# Patient Record
Sex: FEMALE | Race: BLACK OR AFRICAN AMERICAN | NOT HISPANIC OR LATINO | Employment: OTHER | URBAN - METROPOLITAN AREA
[De-identification: names, ages, dates, MRNs, and addresses within clinical notes are randomized per-mention and may not be internally consistent; named-entity substitution may affect disease eponyms.]

---

## 2019-05-16 PROBLEM — L93.0 DISCOID LUPUS: Status: ACTIVE | Noted: 2019-05-16

## 2019-05-16 PROBLEM — S46.012A STRAIN OF LEFT ROTATOR CUFF CAPSULE: Status: ACTIVE | Noted: 2019-05-16

## 2019-11-05 PROBLEM — H20.9 IRITIS OF LEFT EYE: Status: ACTIVE | Noted: 2019-11-05

## 2019-11-05 PROBLEM — H18.519 FUCHS' CORNEAL DYSTROPHY: Status: ACTIVE | Noted: 2019-11-05

## 2020-07-22 PROBLEM — H20.13: Status: ACTIVE | Noted: 2020-07-22

## 2020-08-19 PROBLEM — H26.492 LEFT POSTERIOR CAPSULAR OPACIFICATION: Status: ACTIVE | Noted: 2020-08-19

## 2020-09-16 PROBLEM — H26.491 PCO (POSTERIOR CAPSULAR OPACIFICATION), RIGHT: Status: ACTIVE | Noted: 2020-09-16

## 2020-10-30 DIAGNOSIS — U07.1 COVID-19 VIRUS DETECTED: ICD-10-CM

## 2020-10-31 ENCOUNTER — NURSE TRIAGE (OUTPATIENT)
Dept: ADMINISTRATIVE | Facility: CLINIC | Age: 60
End: 2020-10-31

## 2020-10-31 ENCOUNTER — PATIENT MESSAGE (OUTPATIENT)
Dept: ADMINISTRATIVE | Facility: OTHER | Age: 60
End: 2020-10-31

## 2020-10-31 NOTE — TELEPHONE ENCOUNTER
Pt already talked to a nurse per .....Duplicate    Reason for Disposition   Caller has already spoken with another triager and has no further questions.    Protocols used: NO CONTACT OR DUPLICATE CONTACT CALL-A-AH

## 2020-11-03 ENCOUNTER — NURSE TRIAGE (OUTPATIENT)
Dept: ADMINISTRATIVE | Facility: CLINIC | Age: 60
End: 2020-11-03

## 2020-11-03 NOTE — TELEPHONE ENCOUNTER
Spoke with patient on behalf of COVID symptom tracker. Patient tested positive for COVID 19 on 10/29/20. States she has been having fever for 6 days. This morning her temp was 101.4. C/O cough, chills, muscle and joint pain, HA, weakness. States she gets dizzy when she first gets up. Disposition per protocol is to discuss with PCP.     Reason for Disposition   [1] Fever > 101 F (38.3 C) AND [2] age > 60    Additional Information   Negative: Severe difficulty breathing (e.g., struggling for each breath, speaks in single words)   Negative: Difficult to awaken or acting confused (e.g., disoriented, slurred speech)   Negative: Bluish (or gray) lips or face now   Negative: Shock suspected (e.g., cold/pale/clammy skin, too weak to stand, low BP, rapid pulse)   Negative: Sounds like a life-threatening emergency to the triager   Negative: [1] COVID-19 exposure AND [2] no symptoms   Negative: COVID-19 and Breastfeeding, questions about   Negative: [1] Adult with possible COVID-19 symptoms AND [2] triager concerned about severity of symptoms or other causes   Negative: SEVERE or constant chest pain or pressure (Exception: mild central chest pain, present only when coughing)   Negative: MODERATE difficulty breathing (e.g., speaks in phrases, SOB even at rest, pulse 100-120)   Negative: MILD difficulty breathing (e.g., minimal/no SOB at rest, SOB with walking, pulse <100)   Negative: Chest pain   Negative: Patient sounds very sick or weak to the triager   Negative: Fever > 103 F (39.4 C)    Protocols used: CORONAVIRUS (COVID-19) DIAGNOSED OR [id number]

## 2020-11-03 NOTE — TELEPHONE ENCOUNTER
Thanks greatly for your care of this patient.  I spoke with her myself for a few minutes this afternoon and she reports feeling some better.  I am cautiously optimistic about her recovery and appreciate the ongoing monitoring of her condition from home. Please feel free to contact me through Epic or by phone if questions, but act urgently if needed to ensure her safety.

## 2020-11-04 ENCOUNTER — NURSE TRIAGE (OUTPATIENT)
Dept: ADMINISTRATIVE | Facility: CLINIC | Age: 60
End: 2020-11-04

## 2020-11-07 ENCOUNTER — NURSE TRIAGE (OUTPATIENT)
Dept: ADMINISTRATIVE | Facility: CLINIC | Age: 60
End: 2020-11-07

## 2020-11-07 NOTE — TELEPHONE ENCOUNTER
Pt calling and said that she is feeling better she wants to know what she can take to bring up the mucus, Pt siad that her cough definetely is getting better and she is finally coughing stuff up. Pt denies any fever or SOB. Pt told to try mucinex and honey in hot liquids. Asked about quarantine and said she will continue to monitor and call us as needed      Reason for Disposition   [1] COVID-19 diagnosed by HCP (doctor, NP or PA) AND [2] mild symptoms (e.g., cough, fever, others) AND [3] no complications or SOB    Additional Information   Negative: SEVERE difficulty breathing (e.g., struggling for each breath, speaks in single words)   Negative: Difficult to awaken or acting confused (e.g., disoriented, slurred speech)   Negative: Bluish (or gray) lips or face now   Negative: Shock suspected (e.g., cold/pale/clammy skin, too weak to stand, low BP, rapid pulse)   Negative: Sounds like a life-threatening emergency to the triager   Negative: [1] COVID-19 exposure AND [2] NO symptoms   Negative: COVID-19 and Breastfeeding, questions about   Negative: [1] Adult with possible COVID-19 symptoms AND [2] triager concerned about severity of symptoms or other causes   Negative: SEVERE or constant chest pain or pressure (Exception: mild central chest pain, present only when coughing)   Negative: MODERATE difficulty breathing (e.g., speaks in phrases, SOB even at rest, pulse 100-120)   Negative: Patient sounds very sick or weak to the triager   Negative: MILD difficulty breathing (e.g., minimal/no SOB at rest, SOB with walking, pulse <100)   Negative: Chest pain or pressure   Negative: Fever > 103 F (39.4 C)   Negative: [1] Fever > 101 F (38.3 C) AND [2] age > 60   Negative: [1] Fever > 100.0 F (37.8 C) AND [2] bedridden (e.g., nursing home patient, CVA, chronic illness, recovering from surgery)   Negative: HIGH RISK patient (e.g., age > 64 years, diabetes, heart or lung disease, weak immune system)    Negative: Fever present > 3 days (72 hours)   Negative: [1] Fever returns after gone for over 24 hours AND [2] symptoms worse or not improved   Negative: [1] Continuous (nonstop) coughing interferes with work or school AND [2] no improvement using cough treatment per protocol   Negative: [1] COVID-19 infection suspected by caller or triager AND [2] mild symptoms (cough, fever, or others) AND [3] no complications or SOB   Negative: Cough present > 3 weeks   Negative: [1] COVID-19 diagnosed by positive lab test AND [2] mild symptoms (e.g., cough, fever, others) AND [3] no complications or SOB    Protocols used: CORONAVIRUS (COVID-19) DIAGNOSED OR [id number]

## 2020-11-10 ENCOUNTER — NURSE TRIAGE (OUTPATIENT)
Dept: ADMINISTRATIVE | Facility: CLINIC | Age: 60
End: 2020-11-10

## 2020-11-10 NOTE — TELEPHONE ENCOUNTER
Patient wants to know what is the next step after quarantine.  She only has a little bit cough.  She said that she mention the cough for us to call her back.  Patient will see Dr. Doherty on Monday 11/16.    Reason for Disposition   COVID-19 Disease, questions about    Additional Information   Negative: Severe difficulty breathing (struggling for each breath, unable to speak or cry, making grunting noises with each breath, severe retractions) (Triage tip: Listen to the child's breathing.)   Negative: Slow, shallow, weak breathing   Negative: Bluish (or gray) lips or face now   Negative: Difficult to awaken or not alert when awake   Negative: Very weak (doesn't move or make eye contact)   Negative: Sounds like a life-threatening emergency to the triager   Negative: Stridor (harsh, raspy sound heard with breathing in) confirmed by triager   Negative: [1] COVID-19 exposure AND [2] NO symptoms   Negative: Difficulty breathing confirmed by triager BUT not severe (includes tight breathing and hard breathing)   Negative: Ribs are pulling in with each breath (retractions)   Negative: Age < 12 weeks with fever 100.4 F (38.0 C) or higher rectally   Negative: SEVERE chest pain (excruciating)   Negative: Child sounds very sick or weak to the triager   Negative: Wheezing confirmed by triager   Negative: Rapid breathing (Breaths/min > 60 if < 2 mo; > 50 if 2-12 mo; > 40 if 1-5 years; > 30 if 6-11 years; > 20 if > 12 years)   Negative: MODERATE chest pain that keeps from taking a deep breath   Negative: Lips or face have turned bluish BUTonly during coughing fits   Negative: Fever > 105 F (40.6 C) by any route OR axillary > 104 F (40 C)   Negative: Sore throat AND complication suspected (refuses to drink, can't swallow fluids, new-onset drooling, can't move neck normally or other serious symptom)   Negative: Muscle or body pains AND complication suspected (can't stand, can't walk, can barely walk, can't move arm  or hand normally or other serious symptom)   Negative: Headache AND complication suspected (stiff neck, incapacitated by pain, worst headache ever, confused, weakness or other serious symptom)   Negative: Multisystem Inflammatory Syndrome (MIS-C) suspected (fever, widespread red rash, red eyes, red lips, red palms/soles, swollen hands/feet, abdominal pain, vomiting, diarrhea)   Negative: Dehydration suspected for age < 1 year (signs: no urine > 8 hours AND very dry mouth, no  tears, ill-appearing, etc.)   Negative: Dehydration suspected for age > 1 year (signs: no urine > 12 hours AND very dry mouth, no tears, ill-appearing, etc.)   Negative: Age < 3 months with lots of coughing   Negative: Crying that cannot be comforted lasts > 2 hours   Negative: Age less than 12 weeks and suspected COVID-19 with mild symptoms BUT no fever   Negative: HIGH-RISK patient (e.g., immuno-compromised, lung disease, on oxygen, heart disease, bedridden, etc)   Negative: [1] COVID-19 infection suspected by caller or triager AND [2] mild symptoms (cough, fever and others) AND [3] no complications or SOB   Negative: Continuous coughing keeps from playing or sleeping AND no improvement using cough treatment per protocol   Negative: Fever returns after gone for over 24 hours and symptoms worse or not improved   Negative: Fever present > 3 days (72 hours)   Negative: Earache or ear discharge also present   Negative: Age > 5 years with sinus pain around cheekbone or eye (not just congestion) and fever   Negative: COVID-19 Testing, questions about   Negative: COVID-19 Prevention, questions about   Negative: COVID-19 Home Isolation, questions about   Negative: [1] COVID-19 infection diagnosed or suspected by HCP AND [2] mild symptoms (cough, fever, chills, sore throat, muscle pains, headache, loss of smell) AND [3] needs symptom care advice    Protocols used: CORONAVIRUS (COVID-19) - DIAGNOSED OR [id number]

## 2020-11-17 ENCOUNTER — PATIENT MESSAGE (OUTPATIENT)
Dept: ADMINISTRATIVE | Facility: OTHER | Age: 60
End: 2020-11-17

## 2021-03-28 PROBLEM — Z87.898 HISTORY OF URINARY URGENCY: Status: ACTIVE | Noted: 2021-03-28

## 2021-03-28 PROBLEM — R20.2 PARESTHESIA OF BOTH LOWER EXTREMITIES: Status: ACTIVE | Noted: 2021-03-28

## 2021-05-12 ENCOUNTER — PATIENT MESSAGE (OUTPATIENT)
Dept: RESEARCH | Facility: HOSPITAL | Age: 61
End: 2021-05-12

## 2021-06-26 PROBLEM — E78.5 DYSLIPIDEMIA: Chronic | Status: ACTIVE | Noted: 2021-06-26

## 2021-06-26 PROBLEM — R73.03 PRE-DIABETES: Chronic | Status: ACTIVE | Noted: 2021-06-26

## 2021-12-10 PROBLEM — R26.2 DIFFICULTY WALKING: Status: ACTIVE | Noted: 2021-12-10

## 2021-12-10 PROBLEM — M25.551 PAIN IN RIGHT HIP: Status: ACTIVE | Noted: 2021-12-10

## 2022-06-17 PROBLEM — M17.0 PRIMARY OSTEOARTHRITIS OF BOTH KNEES: Chronic | Status: ACTIVE | Noted: 2022-06-17

## 2022-11-03 PROBLEM — K63.5 POLYP OF COLON: Status: ACTIVE | Noted: 2022-11-03

## 2022-12-14 PROBLEM — R23.2 HOT FLASHES: Chronic | Status: ACTIVE | Noted: 2022-12-14

## 2023-01-18 PROBLEM — M19.011 ARTHROSIS OF RIGHT ACROMIOCLAVICULAR JOINT: Chronic | Status: ACTIVE | Noted: 2023-01-18

## 2023-01-20 PROBLEM — E66.01 SEVERE OBESITY WITH BODY MASS INDEX (BMI) OF 35.0 TO 39.9 WITH COMORBIDITY: Chronic | Status: ACTIVE | Noted: 2023-01-20

## 2023-04-26 PROBLEM — N75.0 BARTHOLIN'S CYST: Status: ACTIVE | Noted: 2023-04-26

## 2023-06-06 ENCOUNTER — TELEPHONE (OUTPATIENT)
Dept: ADMINISTRATIVE | Facility: HOSPITAL | Age: 63
End: 2023-06-06

## 2023-06-06 VITALS — SYSTOLIC BLOOD PRESSURE: 126 MMHG | DIASTOLIC BLOOD PRESSURE: 71 MMHG

## 2023-06-06 NOTE — TELEPHONE ENCOUNTER
FW: Elevated Blood Pressure   Maggie Henry   Sent:   4:28 PM   To: EMMI[facility] [address]   MRN: [id number] : 1960   Pt Home: [phone]     Entered: [phone]        Message    Hi,       Will you add as an at home reading!   ----- Message -----   From: Shari Sultana   Sent: 2023   4:14 PM CDT   To: Maggie Bettencourtomon   Subject: Elevated Blood Pressure                            This  morning  126/71

## 2023-06-26 ENCOUNTER — PATIENT MESSAGE (OUTPATIENT)
Dept: ADMINISTRATIVE | Facility: OTHER | Age: 63
End: 2023-06-26

## 2023-07-09 PROBLEM — E66.9 OBESITY, CLASS II, BMI 35-39.9: Chronic | Status: ACTIVE | Noted: 2023-07-09

## 2023-09-29 PROBLEM — N18.31 STAGE 3A CHRONIC KIDNEY DISEASE: Status: ACTIVE | Noted: 2023-09-29

## 2023-10-01 PROBLEM — D84.821 DRUG-INDUCED IMMUNODEFICIENCY: Status: ACTIVE | Noted: 2023-10-01

## 2023-10-01 PROBLEM — Z79.899 DRUG-INDUCED IMMUNODEFICIENCY: Status: ACTIVE | Noted: 2023-10-01

## 2023-10-09 ENCOUNTER — PATIENT MESSAGE (OUTPATIENT)
Dept: ADMINISTRATIVE | Facility: CLINIC | Age: 63
End: 2023-10-09

## 2023-10-09 ENCOUNTER — TELEPHONE (OUTPATIENT)
Dept: ADMINISTRATIVE | Facility: CLINIC | Age: 63
End: 2023-10-09

## 2023-10-09 ENCOUNTER — OFFICE VISIT (OUTPATIENT)
Dept: HOME HEALTH SERVICES | Facility: CLINIC | Age: 63
End: 2023-10-09

## 2023-10-09 DIAGNOSIS — I10 ESSENTIAL HYPERTENSION: ICD-10-CM

## 2023-10-09 DIAGNOSIS — D84.821 DRUG-INDUCED IMMUNODEFICIENCY: ICD-10-CM

## 2023-10-09 DIAGNOSIS — L93.0 DISCOID LUPUS: ICD-10-CM

## 2023-10-09 DIAGNOSIS — E66.01 MORBID OBESITY: ICD-10-CM

## 2023-10-09 DIAGNOSIS — G47.33 OBSTRUCTIVE SLEEP APNEA ON CPAP: Chronic | ICD-10-CM

## 2023-10-09 DIAGNOSIS — R73.03 PRE-DIABETES: Chronic | ICD-10-CM

## 2023-10-09 DIAGNOSIS — Z00.00 ENCOUNTER FOR PREVENTIVE HEALTH EXAMINATION: Primary | ICD-10-CM

## 2023-10-09 DIAGNOSIS — E78.5 DYSLIPIDEMIA: Chronic | ICD-10-CM

## 2023-10-09 DIAGNOSIS — Z79.899 DRUG-INDUCED IMMUNODEFICIENCY: ICD-10-CM

## 2023-10-09 PROBLEM — N18.31 STAGE 3A CHRONIC KIDNEY DISEASE: Status: RESOLVED | Noted: 2023-09-29 | Resolved: 2023-10-09

## 2023-10-09 PROCEDURE — 99499 NO LOS: ICD-10-PCS | Mod: 95,,, | Performed by: NURSE PRACTITIONER

## 2023-10-09 PROCEDURE — G0439 PR MEDICARE ANNUAL WELLNESS SUBSEQUENT VISIT: ICD-10-PCS | Mod: 95,,, | Performed by: NURSE PRACTITIONER

## 2023-10-09 PROCEDURE — G0439 PPPS, SUBSEQ VISIT: HCPCS | Mod: 95,,, | Performed by: NURSE PRACTITIONER

## 2023-10-09 PROCEDURE — 1158F ADVNC CARE PLAN TLK DOCD: CPT | Mod: 95,,, | Performed by: NURSE PRACTITIONER

## 2023-10-09 PROCEDURE — 1158F PR ADVANCE CARE PLANNING DISCUSS DOCUMENTED IN MEDICAL RECORD: ICD-10-PCS | Mod: 95,,, | Performed by: NURSE PRACTITIONER

## 2023-10-09 PROCEDURE — 99499 UNLISTED E&M SERVICE: CPT | Mod: 95,,, | Performed by: NURSE PRACTITIONER

## 2023-10-09 RX ORDER — DORZOLAMIDE HYDROCHLORIDE AND TIMOLOL MALEATE 20; 5 MG/ML; MG/ML
SOLUTION/ DROPS OPHTHALMIC
COMMUNITY
Start: 2023-10-01 | End: 2024-01-22

## 2023-10-09 NOTE — TELEPHONE ENCOUNTER
Called pt; no answer; left message informing patient I was calling to confirm pt's virtual EAWV today at 11:00am and to see if pt needed any help with setting up for virtual visit and to login 10 minutes prior to scheduled appt; left my name & number for pt to return my call if pt had any questions or concerns; [employer] message sent

## 2023-10-09 NOTE — PROGRESS NOTES
The patient location is: [place]  The chief complaint leading to consultation is: AWV    Visit type: audiovisual    Face to Face time with patient: 45    60 minutes of total time spent on the encounter, which includes face to face time and non-face to face time preparing to see the patient (eg, review of tests), Obtaining and/or reviewing separately obtained history, Documenting clinical information in the electronic or other health record, Independently interpreting results (not separately reported) and communicating results to the patient/family/caregiver, or Care coordination (not separately reported).         Each patient to whom he or she provides medical services by telemedicine is:  (1) informed of the relationship between the physician and patient and the respective role of any other health care provider with respect to management of the patient; and (2) notified that he or she may decline to receive medical services by telemedicine and may withdraw from such care at any time.    Notes:     Review for Opioid Screening: Pt does not have Rx for Opioids  Review for Substance Use Disorders: Patient does not use substance      Shari Sultana presented for a  Medicare AWV and comprehensive Health Risk Assessment today. The following components were reviewed and updated:    Medical history  Family History  Social history  Allergies and Current Medications  Health Risk Assessment  Health Maintenance  Care Team         ** See Completed Assessments for Annual Wellness Visit within the encounter summary.**         The following assessments were completed:  Living Situation  CAGE  Depression Screening  Fall Risk Assessment (MACH 10)  Hearing Assessment(HHI)  Cognitive Function Screening  Nutrition Screening  ADL Screening  PAQ Screening      There were no vitals filed for this visit.  There is no height or weight on file to calculate BMI.  Physical Exam  Constitutional:       Appearance: Normal appearance.    Neurological:      Mental Status: She is alert and oriented to person, place, and time.   Psychiatric:         Mood and Affect: Mood normal.         Behavior: Behavior normal.         Thought Content: Thought content normal.         Judgment: Judgment normal.               Diagnoses and health risks identified today and associated recommendations/orders:    1. Encounter for preventive health examination    Care Gaps Discussed:  Tetanus Vaccine - available at local pharmacy  Last Colonoscopy was in 11/22 and repeat colonoscopy recommended in 3 years (11/2025) due to 2 polyps found and removed.     2. Discoid lupus  Stable. Methotrexate and Cellcept. Followed by Rheumatology. Last visit 8/23.    3. Morbid obesity  Stable. Lifestyle changes/exercise. Followed by PCP.    4. Obstructive sleep apnea on CPAP  Stable. Wears CPAP at night. Followed by PCP.    5. Pre-diabetes  Stable. Taking Metformin. Last Hemoglobin A1c : 5.5 in April 2023. Followed by PCP.    6. Drug-induced immunodeficiency  Stable. Taking Methotrexate due to Descoid Lupus. Followed by Rheumatology.     7. Essential hypertension  Stable. Taking HCTZ, Procardia, Toprol-XL. Followed by PCP.    8. Dyslipidemia  Stable. Taking Atorvastatin. Total Cholesterol 147 and  LDL 56 in April 2023.  Followed by PCP.      Provided Shari with a 5-10 year written screening schedule and personal prevention plan. Recommendations were developed using the USPSTF age appropriate recommendations. Education, counseling, and referrals were provided as needed. After Visit Summary printed and given to patient which includes a list of additional screenings\tests needed.    Follow up in about 1 year (around 10/9/2024) for Virtual Visit.    Martha Iyer NP    I offered to discuss advanced care planning, including how to pick a person who would make decisions for you if you were unable to make them for yourself, called a health care power of , and what kind of decisions  you might make such as use of life sustaining treatments such as ventilators and tube feeding when faced with a life limiting illness recorded on a living will that they will need to know. (How you want to be cared for as you near the end of your natural life)     X Patient is interested in learning more about how to make advanced directives.  I provided them paperwork and offered to discuss this with them. Requested paperwork to be mailed to patient.

## 2023-10-09 NOTE — PATIENT INSTRUCTIONS
Counseling and Referral of Other Preventative  (Italic type indicates deductible and co-insurance are waived)    Patient Name: Shari Sultana  Today's Date: 10/9/2023    Health Maintenance       Date Due Completion Date    TETANUS VACCINE 12/20/2022 12/20/2012    Override on 12/20/2012: Done    COVID-19 Vaccine (6 - 2023-24 season) 11/03/2023 9/8/2023    Shingles Vaccine (2 of 2) 11/24/2023 9/29/2023    Mammogram 04/05/2024 4/5/2023    Hemoglobin A1c (Prediabetes) 04/21/2024 4/21/2023    Colorectal Cancer Screening 11/03/2025 11/3/2022    Override on 3/7/2016: Done    DEXA Scan 07/20/2026 7/20/2023    Lipid Panel 04/21/2028 4/21/2023        No orders of the defined types were placed in this encounter.    The following information is provided to all patients.  This information is to help you find resources for any of the problems found today that may be affecting your health:                Living healthy guide: www.[facility].[place].gov      Understanding Diabetes: www.diabetes.org      Eating healthy: www.cdc.gov/healthyweight      CDC home safety checklist: www.cdc.gov/steadi/patient.html      Agency on Aging: www.goea.[place].[facility]      Alcoholics anonymous (AA): www.aa.org      Physical Activity: www.[place].nih.gov/qr2vlrz      Tobacco use: www.quitwithusla.org

## 2023-10-10 PROBLEM — N75.0 BARTHOLIN'S CYST: Status: RESOLVED | Noted: 2023-04-26 | Resolved: 2023-10-10

## 2023-10-10 PROBLEM — E66.9 OBESITY, CLASS II, BMI 35-39.9: Chronic | Status: RESOLVED | Noted: 2023-07-09 | Resolved: 2023-10-10

## 2023-10-10 PROBLEM — R26.2 DIFFICULTY WALKING: Status: RESOLVED | Noted: 2021-12-10 | Resolved: 2023-10-10

## 2023-10-10 PROBLEM — Z87.898 HISTORY OF URINARY URGENCY: Status: RESOLVED | Noted: 2021-03-28 | Resolved: 2023-10-10

## 2023-12-20 ENCOUNTER — PATIENT MESSAGE (OUTPATIENT)
Dept: ADMINISTRATIVE | Facility: OTHER | Age: 63
End: 2023-12-20

## 2024-05-25 PROBLEM — N95.1 VASOMOTOR SYMPTOMS DUE TO MENOPAUSE: Status: ACTIVE | Noted: 2024-05-25

## 2024-05-25 PROBLEM — R39.15 URINARY URGENCY: Status: ACTIVE | Noted: 2024-05-25

## 2024-05-25 PROBLEM — I10 PRIMARY HYPERTENSION: Status: ACTIVE | Noted: 2024-05-25

## 2024-07-03 DIAGNOSIS — U07.1 COVID-19 VIRUS DETECTED: ICD-10-CM
